# Patient Record
Sex: FEMALE | Race: BLACK OR AFRICAN AMERICAN | Employment: OTHER | URBAN - METROPOLITAN AREA
[De-identification: names, ages, dates, MRNs, and addresses within clinical notes are randomized per-mention and may not be internally consistent; named-entity substitution may affect disease eponyms.]

---

## 2021-03-17 ENCOUNTER — HOSPITAL ENCOUNTER (OUTPATIENT)
Dept: NUTRITION | Age: 71
Discharge: HOME | End: 2021-03-17
Payer: MEDICARE

## 2021-03-17 PROCEDURE — 97802 MEDICAL NUTRITION INDIV IN: CPT

## 2021-03-17 NOTE — PROGRESS NOTES
[facility] - Outpatient Nutrition Services  [address] [place] [address]. [facility], [facility]   Nutrition Assessment  Medical Nutrition Therapy   Outpatient Initial Evaluation         Patient Name: Festus Kiser : 1950   Treatment Diagnosis: CKD, unexplained weight loss    Referral Source: Dewey Page MD [facility] Care [facility] [place]): 3/17/2021     Gender: female Age: 72 y.o. Ht: 60 in Wt: 120  lb  kg   BMI: 23.4 normal BMR   Male  BMR Female 991     Past Medical History:  MS, major depressive disorder, age-related osteoporosis, tobacco abuse (quit in ), ETOH abuse (sober since ), esophageal varices, cirrhosis of the liver, HTN, GERD, dislipidemia, gout     Pertinent Medications:   Lipitor (the patient says she doesn't take this one), blood pressure medicines, Abilify   Biochemical Data:   2/10/21: BUN 31, cre 1.6, GFR 37.9     Assessment:   The patient is here today but she does not know why she is here. She was sent to RD for unintentional weight loss. The patient also has CKD but she has not seen a nephrologist. She says she keeps calling and no one will call her back. The patient states, \"I don't like to eat. \" The patient says she lives with her sister but her sister doesn't eat with her because they don't have the same schedule. The patient says she cannot prepare her own meals because she can't stand at the stove. She also says she has been falling a lot. She came today alone; she walks with a walker. The patient reports that she used to see a psychologist but she hasn't seen anyone in person since the start of the pandemic. Food & Nutrition: The patient provided RD with very little diet history information today. It was very difficult to get the patient to answer the questions. \"I don't eat mammal.\" The patient said she eats only \"fish and foul. \" The patient says she likes most vegetables. She says she does salt her food at the table.  She will eat whatever she is served for dinner. She usually eats a \"breakfast bar\" for breakfast and pineapple/orange juice. She skips lunch. She might drink a Premier Protein drink during the day. She hates drinking water. Estimate Needs   Calories: 1200 for wt maintenance Protein: 44-  55  (.8-1.0 g/kg) Carbs: Fat:    Kcal/day  g/day  g/day  g/day        percent:                    Nutrition Diagnosis   Nutrition knowledge deficit related to healthy eating and diagnosis of CKD as evidenced by the patient did not know why she was seeing RD today and the patient does not enjoy eating. The patient has psychological problems related to eating food. The patient also does not have control over the foods she is eating because she does not grocery shop and prepare her own meals. Nutrition Intervention &  Education: Discussed healthy eating strategies with patient. Encouraged the patient to eat 4-6 \"mini meals\" each day to maintain weight. Eat on a schedule- eating something every 2-3 hours. The patient may consume nutrition supplements if not going to eat a meal. Discussed low sodium diet with patient: don't use salt at the table, don't eats processed foods and canned vegetables and soups. Follow renal guidelines from nephrologist.  RD contact info provided for questions and concerns.      Handouts Provided: []  Carbohydrates  []  Protein  []  Non-starchy Vegetables  []  Food Label  [x]  Meal and Snack Ideas  []  Food Journals []  Diabetes  []  Cholesterol  [x]  Sodium  [x]  Gen Nutr Guidelines  []  SBGM Guidelines  [x]  Others: Nutrition and Kidney Disease   Information Reviewed with: Patient   Readiness to Change Stage: [x]  Pre-contemplative    []  Contemplative  []  Preparation               []  Action                  []  Maintenance   Potential Barriers to Learning: []  Decline in memory (?)   []  Language barrier   []  Other:  [x]  Emotional                  [x]  Limited mobility  [x]  Lack of motivation     [x] Vision, hearing or cognitive impairment   Expected Compliance: Poor. The patient says she does not like to eat. She's says her sister does not eat with her and the patient will not eat alone. She would rather eat \"chocolate stuff. \" She has not seen a nephrologist yet and does not seem to understand why she is seeing RD today. Recommend patient see phychologist/counselor to address depression and poor appetite. Nutritional Goal - To promote lifestyle changes to result in:    []  Weight loss  []  Improved diabetic control  []  Decreased cholesterol levels  []  Decreased blood pressure  [x]  Weight maintenance []  Preventing any interactions associated with food allergies  []  Adequate weight gain toward goal weight  [x]  Other: Prevent acceleration of kidney disease       Patient Goals:   1. Eat 4-6 \"mini meals\" per day to maintain weight. May drink nutrition supplements as appropriate if not going to eat a meal.  2. Follow renal guidelines per nephrologist  3. Be more physical active as tolerated/appropriate. May try chair exercises; walking with walker if safe. Exercise improves mood. Dietitian Signature: Lesly Cueto RD,[facility] Date: 3/17/2021   Follow-up: PRN. Patient will need to bring someone with her (sister) to next appointment as she does not prepare her own meals and chooses not to eat. It is not appropriate for the patient to come alone.  Time: 12:36 PM

## 2021-03-23 ENCOUNTER — HOSPITAL ENCOUNTER (OUTPATIENT)
Dept: PHYSICAL THERAPY | Age: 71
Discharge: HOME | End: 2021-03-23
Payer: MEDICARE

## 2021-03-23 PROCEDURE — 97535 SELF CARE MNGMENT TRAINING: CPT

## 2021-03-23 PROCEDURE — 97163 PT EVAL HIGH COMPLEX 45 MIN: CPT

## 2021-03-23 NOTE — PROGRESS NOTES
[address] [address] [facility] THERAPY   [facility] [address],[facility], [address] [facility] [address] - Phone: [phone]  Fax: [phone] [facility] / [address]  Patient Name: Vicky Perdue : 1950   Medical   Diagnosis: Ataxic gait [R26.0], Repeated Falls R29.6 Treatment Diagnosis: Ataxic gait [R26.0], Repeated Falls R29.6   Onset Date: Worsening over the past year     Referral Source: Am[facility]): 3/23/2021   Prior Hospitalization: See medical history Provider #: [id number]   Prior Level of Function: Was falling less often. Comorbidities: PTSD, HTN, syncope, frequent falls, Left LE surgery, depression, MS, low back pain   Medications: Verified on Patient Summary List   The Plan of Care and following information is based on the information from the initial evaluation.   ===========================================================================================  Subjective: \"I'm not sure why I've been sent here. \" Reports HA and tinnitus in both ears. Reports a few weeks ago was in the kitchen and maybe she turned with the walker and fell. Before that she was taking the trash bag out with her sister, then were tugging and she fell backward. She reports fear of falling is getting worse \"four hundred times worse\". This past Thanksgiving she fell down the stairs due to not picking up her Left LE. The Left LE was broken prior. During this story pt is weeping and reports very scared. Denies being dizzy, but will black out. Says has HTN and takes 4x BP meds, but she can't remember them all. (All meds in hard chart). Assessment / key information:  Pt presents to [facility] at [facility].  with signs and symptoms congruent with a diagnosis of impaired balance, Fear-Of-falling behavior, reduced activity tolerance and reported vasovagal syncope.  This limits her community and home interactions and reduces her QOL and puts her at increased falls risk. Patient would benefit from skilled intervention to address the above deficits, improve quality of life and return patient to maximum level of available function. OBJECTIVE:   BP: 130/90 mmHg  Gait/Posture: Pt uses 4WW (rollator). Fear of not having UE support. Flexed trunk, shortened step length, limited knee motion and hip flexion on the left. Noted shoe build-up on the left due to prior injury/surgery in the 90's. TOPETE Balance Scale: 27/56 indicating severe falls risk. Dizziness Handicap Inventory [facility]): 60/100 **Pt denies dizziness, but reports that the score represents her difficulty with those items**    Timed Up n GO (TUG): 29 sec without AD. 24 sec with 4WW.  5 time sit-to-stand (5xSTS): 39 sec definite use of hands on knees. Left hip flexion is limited functionally, slow and limited left knee bend and LEFT hip flexion is at a 3/5 MMT.    ===========================================================================================  Eval Complexity: History HIGH Complexity :3+ comorbidities / personal factors will impact the outcome/ POC ;  Examination  HIGH Complexity : 4+ Standardized tests and measures addressing body structure, function, activity limitation and / or participation in recreation ; Presentation HIGH Complexity : Unstable and unpredictable characteristics ;   Decision Making Other outcome measures DHI and TOPETE  HIGH ; Overall Complexity HIGH   Problem List: pain affecting function, impaired gait/ balance, decrease ADL/ functional abilitiies, decrease activity tolerance, decrease flexibility/ joint mobility and decrease transfer abilities   Treatment Plan may include any combination of the following: Therapeutic exercise, Therapeutic activities, Neuromuscular re-education, Gait/balance training, Aquatic therapy, Patient education, Self Care training and Functional mobility training  Patient / Family readiness to learn indicated by: asking questions and interest  Persons(s) to be included in education: patient (P)  Barriers to Learning/Limitations: None  Measures taken, if barriers to learning:    Patient Goal (s): Get her left leg working   Patient self reported health status: fair  Rehabilitation Potential: good   Short Term Goals: To be accomplished in  4  weeks  STG1 Pt to report adherence and demonstrate compliance with basic HEP to allow progress between visits  STG2 Pt demonstrate 5xSTS to <28 sec with/without use of hands to indicate improved function in the home   Long Term Goals: To be accomplished in  8  weeks  LTG1 Pt to improve TOPETE score to 40/56 indicating improved function and reduced falls risk  LTG2 Pt to reduce DHI score to 45/100 to indicate improved QOL and reduced falls risk  LTG3 Pt to deny falls over the past 4 weeks to indicate reduction in falls risk  LTG4 Pt to reduce TUG score to <20 sec with/without AD to indicate improved QOL and reduced falls risk    Frequency / Duration:   Patient to be seen  2  times per week for 8  weeks  Patient / Caregiver education and instruction: exercises  Therapist Signature: Florence Scott PT Date: 4/77/0011   Certification Period: 3/23/21 - 6/21/21 Time: 2:51 PM   ===========================================================================================  I certify that the above Physical Therapy Services are being furnished while the patient is under my care. I agree with the treatment plan and certify that this therapy is necessary. Physician Signature:        Date:       Time:                                        Ag Asif, *  Please sign and return to [facility] at [facility], [facility]. or you may fax the signed copy to [phone]. Thank you.

## 2021-03-23 NOTE — PROGRESS NOTES
PHYSICAL THERAPY - DAILY TREATMENT NOTE    Patient Name: Chela Benites        Date: 3/23/2021  : 1950   YES Patient  Verified  Visit #:     Insurance: Payor: Lachelle Shi / Plan: [address] [place] [address] HMO / Product Type: Managed Care Medicare /      In time: 3:45 Out time: 4:$5   Total Treatment Time: 60     Medicare/BCBS Time Tracking (below)   Total Timed Codes (min):  15 1:1 Treatment Time:  60     TREATMENT AREA =  Ataxic gait [R26.0]    SUBJECTIVE    Pain Level (on 0 to 10 scale):  N/A  / 10   Medication Changes/New allergies or changes in medical history, any new surgeries or procedures? NO    If yes, update Summary List   Subjective Functional Status/Changes:  []  No changes reported     See POC          OBJECTIVE    15 min Self Care: Reviewed diagnosis, prognosis, therapy progression, HEP-exercises   Rationale:    Improve understanding of injury and therapy to have realistic expectation of therapy to improve compliance/adherence and satisfaction    Billed With/As:   [] TE   [] TA   [] Neuro   [x] Self Care Patient Education: [x] Review HEP    [] Progressed/Changed HEP based on:   [x] Addressed barriers and behaviors     [] Therapeutic Neuroscience Pain Education, metaphor, reframing, contexts.     [x] Sleep Education   [] Body Mechanics [x] Healing Timeframe     [] Self STM with ball at \"the spot\"  [x] Walking Program/Global Activity   [] other:         Other Objective/Functional Measures:    See POC note     Post Treatment Pain Level (on 0 to 10) scale:   N/A / 10     ASSESSMENT  Assessment/Changes in Function:     See POC     []  See Progress Note/Recertification   Patient will continue to benefit from skilled PT services to modify and progress therapeutic interventions, address functional mobility deficits, address ROM deficits, address strength deficits, analyze and address soft tissue restrictions, analyze and cue movement patterns, analyze and modify body mechanics/ergonomics and assess and modify postural abnormalities to attain goals per POC. Progress toward goals / Updated goals:    Pt evaluated today.  See POC     PLAN  [x]  Upgrade activities as tolerated YES Continue plan of care   []  Discharge due to :    []  Other:      Therapist: Oren Walker, PT, DPT, OCS    Date: 3/23/2021 Time: 2:50 PM

## 2021-03-31 ENCOUNTER — HOSPITAL ENCOUNTER (OUTPATIENT)
Dept: PHYSICAL THERAPY | Age: 71
Discharge: HOME | End: 2021-03-31
Payer: MEDICARE

## 2021-03-31 PROCEDURE — 97116 GAIT TRAINING THERAPY: CPT

## 2021-03-31 PROCEDURE — 97530 THERAPEUTIC ACTIVITIES: CPT

## 2021-03-31 PROCEDURE — 97110 THERAPEUTIC EXERCISES: CPT

## 2021-03-31 NOTE — PROGRESS NOTES
PHYSICAL THERAPY - DAILY TREATMENT NOTE    Patient Name: Owen Best        Date: 3/31/2021  : 1950   YES Patient  Verified  Visit #:   2   of   16  Insurance: Payor: Cornel Isatu / Plan: [address] [place] [address] HMO / Product Type: Managed Care Medicare /      In time: 11:29 Out time: 12:13   Total Treatment Time: 44     Medicare Time /BCBS Tracking (below)   Total Timed Codes (min):  44 1:1 Treatment Time:  44     TREATMENT AREA =  Ataxic gait [R26.0]    SUBJECTIVE  Pain Level (on 0 to 10 scale):  3  / 10 HA   Medication Changes/New allergies or changes in medical history, any new surgeries or procedures? NO    If yes, update Summary List   Subjective Functional Status/Changes:  []  No changes reported     Patient reports that each day she has HA and back/left hip pain. She lost her home exercise handouts. OBJECTIVE  19 min Therapeutic Exercise:  [x]  See flow sheet   Rationale:      increase strength to improve the patients ability to stand, walk and perform ADLs       15 min Therapeutic Activity: Patient practiced sit to stand and was educated in safe execution and equal weight bearing on BLE. Postural education to increase back extension for sitting and standing. Sitting with equal weight on ischial tuberosities. Rationale:    na to improve the patients ability to na     min Neuromuscular Re-ed: t   Rationale:    Improve dizziness and balance to improve the patients ability to perform ADLs, walk and to decrease fall risk    10 min Gait Training: Gait with wheeled walker 80 feet x1, 120 x1 with supervision and education on maintaining trunk extension and for safety with stand to sit. Rationale:   Improve dynamic balance to increase stability with walking and decrease fall risk      min Patient Education:  YES  Reviewed HEP   []  Progressed/Changed HEP based on:  Issued 2 copies of HEP. Other Objective/Functional Measures:      Reviewed HEP.   Added sit to stand, LAQs, HR/TR, modified tandem(heel to instep), gait training with wheeled walker. Post Treatment Pain Level (on 0 to 10) scale:   3  / 10     ASSESSMENT  Assessment/Changes in Function:     Decreased safety awareness with sit to stand and stand to sit. Patient required cueing for correct execution of all exercises. Flexed posture while walking with wheeled walker which patient can correct with cueing. Impaired use of hip balance strategy. []  See Progress Note/Recertification   Patient will continue to benefit from skilled PT services to modify and progress therapeutic interventions, address functional mobility deficits, address ROM deficits, address strength deficits, analyze and address soft tissue restrictions, analyze and cue movement patterns, analyze and modify body mechanics/ergonomics and assess and modify postural abnormalities to attain goals per POC.    Progress toward goals / Updated goals:  STG1 Pt to report adherence and demonstrate compliance with basic HEP to allow progress between visits  STG2 Pt demonstrate 5xSTS to <28 sec with/without use of hands to indicate improved function in the home       PLAN  [x]  Upgrade activities as tolerated YES Continue plan of care   []  Discharge due to :    []  Other:      Therapist: Char White PT    Date: 3/31/2021 Time: 12:13 PM     Future Appointments   Date Time Provider Jo Carpenter   3/31/2021 11:15 AM Kera Sanford, PT [facility]   4/7/2021 11:30 AM JT Navarrete [id number]   4/9/2021 10:00 AM Sravanthi Neal PTA [facility]   4/14/2021 11:30 AM Sravanthi Neal PTA [facility]   4/16/2021 11:00 AM Lacy Masterson PT [facility]   4/21/2021 11:30 AM Sravanthi Neal PTA [facility]   4/23/2021 11:00 AM Lacy Masterson, PT [facility]   4/28/2021 11:30 AM Sravanthi Neal PTA [facility]   4/30/2021 11:00 AM Lucio Potts, PT [phone]

## 2021-04-07 ENCOUNTER — APPOINTMENT (OUTPATIENT)
Dept: PHYSICAL THERAPY | Age: 71
End: 2021-04-07
Payer: MEDICARE

## 2021-04-09 ENCOUNTER — APPOINTMENT (OUTPATIENT)
Dept: PHYSICAL THERAPY | Age: 71
End: 2021-04-09
Payer: MEDICARE

## 2021-04-14 ENCOUNTER — APPOINTMENT (OUTPATIENT)
Dept: PHYSICAL THERAPY | Age: 71
End: 2021-04-14
Payer: MEDICARE

## 2021-04-16 ENCOUNTER — APPOINTMENT (OUTPATIENT)
Dept: PHYSICAL THERAPY | Age: 71
End: 2021-04-16
Payer: MEDICARE

## 2021-04-21 ENCOUNTER — HOSPITAL ENCOUNTER (OUTPATIENT)
Dept: PHYSICAL THERAPY | Age: 71
Discharge: HOME | End: 2021-04-21
Payer: MEDICARE

## 2021-04-21 PROCEDURE — 97110 THERAPEUTIC EXERCISES: CPT

## 2021-04-21 PROCEDURE — 97140 MANUAL THERAPY 1/> REGIONS: CPT

## 2021-04-21 NOTE — PROGRESS NOTES
PHYSICAL THERAPY - DAILY TREATMENT NOTE    Patient Name: Belem Reeves        Date: 2021  : 1950   yes Patient  Verified  Visit #:   3   of   16  Insurance: Payor: Eddie Charlton / Plan: [address] [place] [address] HMO / Product Type: Managed Care Medicare /      In time:  Out time: 1210   Total Treatment Time: 35     Medicare/BCBS Time Tracking (below)   Total Timed Codes (min):  35 1:1 Treatment Time:  35     TREATMENT AREA =  Ataxic gait [R26.0]    SUBJECTIVE  Pain Level (on 0 to 10 scale): 3-4  / 10   Medication Changes/New allergies or changes in medical history, any new surgeries or procedures?    no  If yes, update Summary List   Subjective Functional Status/Changes:  []  No changes reported     i'm always in a lot of pain,  Patient denies performance of HEP. OBJECTIVE      25 min Therapeutic Exercise:  [x]  See flow sheet   Rationale:      increase ROM, increase strength, improve coordination and improve balance to improve the patients ability to perform functional mobility activities with decrease concerns for safety. 10 min Manual Therapy: STM L/S paraspinals (B) and (R) QL in sitting position. Rationale:      decrease pain, increase ROM and increase tissue extensibility to improve patient's ability to perform functional mobility activities with decrease c/o symptoms. The manual therapy interventions were performed at a separate and distinct time from the therapeutic activities interventions. Billed With/As:   [x] TE   [] TA   [] Neuro   [] Self Care Patient Education: [x] Review HEP    [] Progressed/Changed HEP based on:   [] positioning   [] body mechanics   [] transfers   [] heat/ice application    [] other:      Other Objective/Functional Measures:    Continue with therx per flow sheet. All therx performed in parallel bars.      Post Treatment Pain Level (on 0 to 10) scale:   0  / 10     ASSESSMENT  Assessment/Changes in Function:     Overall good technique with sit <> stand transfer, uses hands to support and push up with, demonstrates in weight bearing through (R) LE. Good reduction of pain after therx and manual.     []  See Progress Note/Recertification   Patient will continue to benefit from skilled PT services to modify and progress therapeutic interventions, address functional mobility deficits, address ROM deficits, address strength deficits and analyze and cue movement patterns to attain remaining goals. Progress toward goals / Updated goals:    STG1 Pt to report adherence and demonstrate compliance with basic HEP to allow progress between visits  STG2 Pt demonstrate 5xSTS to <28 sec with/without use of hands to indicate improved function in the home  · Long Term Goals:  To be accomplished in  8  weeks  LTG1 Pt to improve TOPETE score to 40/56 indicating improved function and reduced falls risk  LTG2 Pt to reduce DHI score to 45/100 to indicate improved QOL and reduced falls risk  LTG3 Pt to deny falls over the past 4 weeks to indicate reduction in falls risk  LTG4 Pt to reduce TUG score to <20 sec with/without AD to indicate improved QOL and reduced falls risk        PLAN  []  Upgrade activities as tolerated yes Continue plan of care   []  Discharge due to :    []  Other:      Therapist: Bhaskar Malhotra PTA    Date: 4/21/2021 Time: 6:20 AM     Future Appointments   Date Time Provider Jo Carpenter   4/21/2021 11:30 AM Isabella Arango PTA [facility]   4/23/2021 11:00 AM Junior Lozano, PT [facility]   4/28/2021 11:30 AM Isabella Arango PTA [facility]   4/30/2021 11:00 AM Hue Potts, PT [phone]

## 2021-04-23 ENCOUNTER — HOSPITAL ENCOUNTER (OUTPATIENT)
Dept: PHYSICAL THERAPY | Age: 71
Discharge: HOME | End: 2021-04-23
Payer: MEDICARE

## 2021-04-23 PROCEDURE — 97110 THERAPEUTIC EXERCISES: CPT

## 2021-04-23 PROCEDURE — 97140 MANUAL THERAPY 1/> REGIONS: CPT

## 2021-04-23 NOTE — PROGRESS NOTES
PHYSICAL THERAPY - DAILY TREATMENT NOTE    Patient Name: Sharon Vo        Date: 2021  : 1950   yes Patient  Verified  Visit #:   4   of   16  Insurance: Payor: Sumaya Jaylin / Plan: [address] [place] [address] HMO / Product Type: Managed Care Medicare /      In time:  Out time: 12:00   Total Treatment Time: 45     Medicare/BCBS Time Tracking (below)   Total Timed Codes (min):  45 1:1 Treatment Time:  45     TREATMENT AREA =  Ataxic gait [R26.0]    SUBJECTIVE  Pain Level (on 0 to 10 scale): 3-4  / 10   Medication Changes/New allergies or changes in medical history, any new surgeries or procedures?    no  If yes, update Summary List   Subjective Functional Status/Changes:  []  No changes reported     Reports she liked the manual therapy last visit. Says she can't do anything any more. He low back hurts when she stands and she can't do anything with her left leg. OBJECTIVE  35 min Therapeutic Exercise:  [x]  See flow sheet   Rationale:      increase ROM, increase strength, improve coordination and improve balance to improve the patients ability to perform functional mobility activities with decrease concerns for safety. 10 min Manual Therapy: STM L/S paraspinals (B) and (R) QL in sitting position   Rationale:      decrease pain, increase ROM and increase tissue extensibility to improve patient's ability to perform functional mobility activities with decrease c/o symptoms. The manual therapy interventions were performed at a separate and distinct time from the therapeutic activities interventions. Restroom break taken mid-session    Billed With/As:   [x] TE   [] TA   [] Neuro   [] Self Care Patient Education: [x] Review HEP    [] Progressed/Changed HEP based on:   [] positioning   [] body mechanics   [] transfers   [] heat/ice application    [] other:      Other Objective/Functional Measures:    Continue with therx per flow sheet. All therx performed in parallel bars.     1x LOB to the right with side stepping without UE support. Pt did not lift the left LE to clear the ground. Stumble noted, pt reached for //bars and I assisted at shoulders to avoid any fall. Post Treatment Pain Level (on 0 to 10) scale:   0-3  / 10     ASSESSMENT  Assessment/Changes in Function:     Left LE remains weak with poor ROM reducing function. LBP wit standing drills when pt does not use UE support. Lumbar fatigue and difficulty with extending lumbar ot remain neutral/upright. Attempted to progress drills, pt with poor personal outlook, high barriers verbalized to activity. Max level cues utilized to improve motion and performance. Pt declined modified tandem stance today. Sitting manual STM not tolerated as prior in session. Pt with significant pain along the right QL which was aggravated by STM in session. []  See Progress Note/Recertification   Patient will continue to benefit from skilled PT services to modify and progress therapeutic interventions, address functional mobility deficits, address ROM deficits, address strength deficits and analyze and cue movement patterns to attain remaining goals. Progress toward goals / Updated goals:    STG1 Pt to report adherence and demonstrate compliance with basic HEP to allow progress between visits  STG2 Pt demonstrate 5xSTS to <28 sec with/without use of hands to indicate improved function in the home  · Long Term Goals:  To be accomplished in  8  weeks  LTG1 Pt to improve TOPETE score to 40/56 indicating improved function and reduced falls risk  LTG2 Pt to reduce DHI score to 45/100 to indicate improved QOL and reduced falls risk  LTG3 Pt to deny falls over the past 4 weeks to indicate reduction in falls risk  LTG4 Pt to reduce TUG score to <20 sec with/without AD to indicate improved QOL and reduced falls risk        PLAN  []  Upgrade activities as tolerated yes Continue plan of care   []  Discharge due to :    []  Other:      Therapist: Hue Tomlin Katlyn, PT    Date: 4/23/2021 Time: 11:05 AM     Future Appointments   Date Time Provider Jo Carpenter   4/28/2021 11:30 AM Anai Alfredo [facility]   4/30/2021 11:00 AM Alfonso Potts, PT [id number]

## 2021-04-28 ENCOUNTER — HOSPITAL ENCOUNTER (OUTPATIENT)
Dept: PHYSICAL THERAPY | Age: 71
Discharge: HOME | End: 2021-04-28
Payer: MEDICARE

## 2021-04-28 PROCEDURE — 97140 MANUAL THERAPY 1/> REGIONS: CPT

## 2021-04-28 PROCEDURE — 97110 THERAPEUTIC EXERCISES: CPT

## 2021-04-28 NOTE — PROGRESS NOTES
PHYSICAL THERAPY - DAILY TREATMENT NOTE    Patient Name: Joe Copping        Date: 2021  : 1950   yes Patient  Verified  Visit #:      of   16  Insurance: Payor: Jaye Hurtado / Plan: [address] [place] [address] HMO / Product Type: Managed Care Medicare /      In time: 8046 Out time: 0664   Total Treatment Time: 35     Medicare/BCBS Time Tracking (below)   Total Timed Codes (min):  35 1:1 Treatment Time:  35     TREATMENT AREA =  Ataxic gait [R26.0]    SUBJECTIVE  Pain Level (on 0 to 10 scale):  1  / 10   Medication Changes/New allergies or changes in medical history, any new surgeries or procedures?    no  If yes, update Summary List   Subjective Functional Status/Changes:  []  No changes reported     i'm doing well today, i'm here. [place] fine after last session          OBJECTIVE    25 min Therapeutic Exercise:  [x]  See flow sheet   Rationale:      increase ROM, increase strength, improve coordination and improve balance to improve the patients ability to perform functional mobility activities with decrease concerns for safety. 10 min Manual Therapy: STM L/S paraspinals (B) and (R) QL in sitting position. Rationale:      decrease pain, increase ROM and increase tissue extensibility to improve patient's ability to perform functional mobility activities with decrease concerns for safety. The manual therapy interventions were performed at a separate and distinct time from the therapeutic activities interventions. Billed With/As:   [x] TE   [] TA   [] Neuro   [] Self Care Patient Education: [x] Review HEP    [] Progressed/Changed HEP based on:   [] positioning   [] body mechanics   [] transfers   [] heat/ice application    [] other:      Other Objective/Functional Measures: Add seated hip add and abd (ball/peach) 10 x 3 seconds.      Post Treatment Pain Level (on 0 to 10) scale:   2/ 10     ASSESSMENT  Assessment/Changes in Function:     Patient ambulated 75' x 2 without RW with gait belt and CGA for safety,  Patient was hesiitant while ambulating, but was able to perform task without significant difficulty     []  See Progress Note/Recertification   Patient will continue to benefit from skilled PT services to modify and progress therapeutic interventions, address functional mobility deficits, address ROM deficits, address strength deficits, analyze and address soft tissue restrictions and analyze and cue movement patterns to attain remaining goals. Progress toward goals / Updated goals:    STG1 Pt to report adherence and demonstrate compliance with basic HEP to allow progress between visits  STG2 Pt demonstrate 5xSTS to <28 sec with/without use of hands to indicate improved function in the home  · Long Term Goals:  To be accomplished in  8  weeks  LTG1 Pt to improve TOPETE score to 40/56 indicating improved function and reduced falls risk  LTG2 Pt to reduce DHI score to 45/100 to indicate improved QOL and reduced falls risk  LTG3 Pt to deny falls over the past 4 weeks to indicate reduction in falls risk  LTG4 Pt to reduce TUG score to <20 sec with/without AD to indicate improved QOL and reduced falls risk       PLAN  []  Upgrade activities as tolerated yes Continue plan of care   []  Discharge due to :    []  Other:      Therapist: Dillon Del Cid PTA    Date: 4/28/2021 Time: 6:25 AM     Future Appointments   Date Time Provider Jo Carpenter   4/28/2021 11:30 AM Zonia Weber, PTA [facility]   4/30/2021 11:00 AM Jinny Dickinson, PT [facility]   5/5/2021 12:15 PM Zonia Weber, PTA [facility]   5/7/2021 11:00 AM Jinny Dickinson, PT [facility]   5/11/2021 11:15 AM Zonia Weber, PTA [facility]   5/13/2021  1:15 PM Jinny Dickinson, PT [facility]   5/18/2021  1:15 PM Jinny Dickinson, PT [facility]   5/20/2021 12:30 PM Jinny Dickinson, PT [facility]   5/25/2021 11:15 AM Zonia Weber, PTA [facility]   5/27/2021 12:30 PM Dancigers, Eulice Nageotte, PT [id number]

## 2021-04-30 ENCOUNTER — HOSPITAL ENCOUNTER (OUTPATIENT)
Dept: PHYSICAL THERAPY | Age: 71
Discharge: HOME | End: 2021-04-30
Payer: MEDICARE

## 2021-04-30 PROCEDURE — 97530 THERAPEUTIC ACTIVITIES: CPT

## 2021-04-30 PROCEDURE — 97110 THERAPEUTIC EXERCISES: CPT

## 2021-04-30 NOTE — PROGRESS NOTES
PHYSICAL THERAPY - DAILY TREATMENT NOTE    Patient Name: Carlos Matos        Date: 2021  : 1950   yes Patient  Verified  Visit #:   6      16  Insurance: Payor: Eben Citizen / Plan: [address] [place] [address] HMO / Product Type: Managed Care Medicare /      In time: 1100 Out time: 5695   Total Treatment Time: 45     Medicare/BCBS Time Tracking (below)   Total Timed Codes (min):  45 1:1 Treatment Time:  45     TREATMENT AREA =  Ataxic gait [R26.0]    SUBJECTIVE  Pain Level (on 0 to 10 scale):  1  / 10   Medication Changes/New allergies or changes in medical history, any new surgeries or procedures?    no  If yes, update Summary List   Subjective Functional Status/Changes:  []  No changes reported     Says she hasn't figured out how she's doing yet. She's only been getting ready to come here and hasn't taken an inventory. OBJECTIVE  15 min Therapeutic Activity: [x]  See flow sheet   Rationale:    increase ROM, increase strength and improve coordination to improve the patients ability to tolerate distance, transfer and improve balance and stability in standing    30 min Therapeutic Exercise:  [x]  See flow sheet   Rationale:      increase ROM, increase strength, improve coordination and improve balance to improve the patients ability to perform functional mobility activities with decrease concerns for safety. Billed With/As:   [x] TE   [] TA   [] Neuro   [] Self Care Patient Education: [x] Review HEP    [] Progressed/Changed HEP based on:   [x] positioning   [x] body mechanics   [] transfers   [] heat/ice application    [] other:      Other Objective/Functional Measures:    Pt did not use 4WW after entering clinic. After coughing: pt reports hiatal hernia pain. This pain recurred 2-3x after coughing in session. I instructed her to alert her PCP/Nurse, since she was about to be in contact with them to get blood work done later today. Pt reports she might call later about it. Post Treatment Pain Level (on 0 to 10) scale:   0-4/ 10     ASSESSMENT  Assessment/Changes in Function:     Pt continues to have poor self efficacy and external locus of control, yet willing to work through current status to improve standing and walking. Balance drills highly agitating to pt, max level cues to assist her in //bars during. Pt completed drills per flowsheet. Patient ambulated 75' x 2, 35 ft x1 without RW with gait belt and CGA for safety,  Patient was hesiitant while ambulating, but was able to perform task without significant difficulty     []  See Progress Note/Recertification   Patient will continue to benefit from skilled PT services to modify and progress therapeutic interventions, address functional mobility deficits, address ROM deficits, address strength deficits, analyze and address soft tissue restrictions and analyze and cue movement patterns to attain remaining goals. Progress toward goals / Updated goals:    STG1 Pt to report adherence and demonstrate compliance with basic HEP to allow progress between visits  STG2 Pt demonstrate 5xSTS to <28 sec with/without use of hands to indicate improved function in the home  · Long Term Goals:  To be accomplished in  8  weeks  LTG1 Pt to improve TOPETE score to 40/56 indicating improved function and reduced falls risk  LTG2 Pt to reduce DHI score to 45/100 to indicate improved QOL and reduced falls risk  LTG3 Pt to deny falls over the past 4 weeks to indicate reduction in falls risk  LTG4 Pt to reduce TUG score to <20 sec with/without AD to indicate improved QOL and reduced falls risk       PLAN  []  Upgrade activities as tolerated yes Continue plan of care   []  Discharge due to :    []  Other:      Therapist: Miri Schrader PT    Date: 4/30/2021 Time: 6:25 AM     Future Appointments   Date Time Provider Jo Carpenter   5/5/2021 12:15 PM Santhosh Hogue [facility]   5/7/2021 11:00 AM Sharonda Asher, PT [id number]   5/11/2021 11:15 AM [facility]   5/13/2021  1:15 PM Damari Purdy, PT [facility]   5/18/2021  1:15 PM Damari Purdy, PT [facility]   5/20/2021 12:30 PM Damari Purdy, PT [facility]   5/25/2021 11:15 AM [facility]   5/27/2021 12:30 PM Vel Potts, PT [id number]

## 2021-05-05 ENCOUNTER — HOSPITAL ENCOUNTER (OUTPATIENT)
Dept: PHYSICAL THERAPY | Age: 71
Discharge: HOME | End: 2021-05-05
Payer: MEDICARE

## 2021-05-05 PROCEDURE — 97140 MANUAL THERAPY 1/> REGIONS: CPT

## 2021-05-05 PROCEDURE — 97110 THERAPEUTIC EXERCISES: CPT

## 2021-05-05 NOTE — PROGRESS NOTES
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   · Short Term Goals: To be accomplished in  4  weeks  STG1 Pt to report adherence and demonstrate compliance with basic HEP to allow progress between visits: Status: Variable  STG2 Pt demonstrate 5xSTS to <28 sec with/without use of hands to indicate improved function in the home: Status: No change  · Long Term Goals: To be accomplished in  8  weeks  LTG1 Pt to improve TOPETE score to 40/56 indicating improved function and reduced falls risk  LTG2 Pt to reduce DHI score to 45/100 to indicate improved QOL and reduced falls risk  LTG3 Pt to deny falls over the past 4 weeks to indicate reduction in falls risk  LTG4 Pt to reduce TUG score to <20 sec with/without AD to indicate improved QOL and reduced falls risk    Key Functional Changes/Progress: Patient seen for Evaluation, 2 follow-ups, then this Re-Cert visit. Pt continues to present with poor safety and functional walking and mobility skills. Her current plan to to be seen for 12 more visits to address deconditioning, walking, balance and safety. I recommend this POC take place at a 2x/wk frequency.     Problem List: pain affecting function, decrease ROM, decrease strength, impaired gait/ balance, decrease ADL/ functional abilitiies and decrease activity tolerance   Treatment Plan may include any combination of the following: Therapeutic exercise, Therapeutic activities, Neuromuscular re-education, Physical agent/modality, Gait/balance training, Manual therapy, Patient education, Self Care training and Functional mobility training     Patient Goal(s) has been updated and includes:  See above STGs, LTGs    Goals for this certification period include and are to be achieved in   6  weeks:    Frequency / Duration:   Patient to be seen   2   times per week for   6   more weeks from date of service. Assessments/Recommendations:   Continue to progress this patient, as able, towards new/stated goals    If you have any questions/comments please contact us directly at [phone]. Thank you for allowing us to assist in the care of your patient. Therapist Signature: Tiffanie Marquez PT Date: 2/9/7289   Certification Period:  Reporting Period: 3/23/21 - 6/21/21  3/23/21 - 4/23/21 Time: 6:56 AM   NOTE TO PHYSICIAN:  PLEASE COMPLETE THE ORDERS BELOW AND FAX TO   [facility]: [phone]  If you are unable to process this request in 24 hours please contact our office: [phone]    ___ I have read the above report and request that my patient continue as recommended.   ___ I have read the above report and request that my patient continue therapy with the following changes/special instructions: ________________________________________________   ___ I have read the above report and request that my patient be discharged from therapy.      Physician Signature:        Date:       Time:                                       Delmis Levi, *

## 2021-05-05 NOTE — PROGRESS NOTES
PHYSICAL THERAPY - DAILY TREATMENT NOTE    Patient Name: Jose Geronimo        Date: 2021  : 1950   yes Patient  Verified  Visit #:   7      16  Insurance: Payor: Reginald Navarrociro / Plan: [address] [place] [address] HMO / Product Type: Managed Care Medicare /      In time: 3023 Out time: 100   Total Treatment Time: 40     Medicare/BCBS Time Tracking (below)   Total Timed Codes (min):  40 1:1 Treatment Time:  40     TREATMENT AREA =  Ataxic gait [R26.0]    SUBJECTIVE  Pain Level (on 0 to 10 scale):  5  / 10   Medication Changes/New allergies or changes in medical history, any new surgeries or procedures?    no  If yes, update Summary List   Subjective Functional Status/Changes:  []  No changes reported     My (L) side of my neck and shoulder are really bothering me more. Patient ambulated into clinic with use of her sister's SPC which not at the appropriate height. Patient stated that someone had take her walker out of the car but did not return it. OBJECTIVE    30 min Therapeutic Exercise:  [x]  See flow sheet   Rationale:      increase ROM, increase strength and improve coordination to improve the patients ability to perform functional mobility activities with decrease concerns for safety.       10 min Manual Therapy: STM L/S paraspinals (B) and (R) QL in sitting position. Rationale:      decrease pain, increase ROM and increase tissue extensibility to improve patient's ability to perform functional mobility activities with decrease concerns for safety.    The manual therapy interventions were performed at a separate and distinct time from the therapeutic activities interventions.     Billed With/As:   [x] TE   [] TA   [] Neuro   [] Self Care Patient Education: [x] Review HEP    [] Progressed/Changed HEP based on:   [] positioning   [] body mechanics   [] transfers   [] heat/ice application    [] other:      Other Objective/Functional Measures:    Continue with therx per flow sheet for LE and core strengthening. Add: tap ups 4\" with 1# ankle weights 15x,  In parallel bars: small hurdles with non-reciprocal pattern and 1# ankle weights. Post Treatment Pain Level (on 0 to 10) scale:   4  / 10     ASSESSMENT  Assessment/Changes in Function:     Patient ambulated without use of AD and HHA  For steadying assistance. []  See Progress Note/Recertification   Patient will continue to benefit from skilled PT services to modify and progress therapeutic interventions, address functional mobility deficits, address ROM deficits, address strength deficits, analyze and address soft tissue restrictions and analyze and cue movement patterns to attain remaining goals. Progress toward goals / Updated goals:    STG1 Pt to report adherence and demonstrate compliance with basic HEP to allow progress between visits  STG2 Pt demonstrate 5xSTS to <28 sec with/without use of hands to indicate improved function in the home  · Long Term Goals:  To be accomplished in  8  weeks  LTG1 Pt to improve TOPETE score to 40/56 indicating improved function and reduced falls risk  LTG2 Pt to reduce DHI score to 45/100 to indicate improved QOL and reduced falls risk  LTG3 Pt to deny falls over the past 4 weeks to indicate reduction in falls risk  LTG4 Pt to reduce TUG score to <20 sec with/without AD to indicate improved QOL and reduced falls risk       PLAN  []  Upgrade activities as tolerated yes Continue plan of care   []  Discharge due to :    []  Other:      Therapist: Erasto Pham PTA    Date: 5/5/2021 Time: 6:37 AM     Future Appointments   Date Time Provider Jo Carpenter   5/5/2021 12:15 PM Rodrigo Ramesh [facility]   5/7/2021 11:00 AM Ana Paula Chase PT [facility]   5/11/2021 11:15 AM Ricardo Garcia PTA [place] [facility]   5/13/2021  1:15 PM Ana Paula Chase PT [facility]   5/18/2021  1:15 PM Ana Paula Chase PT [facility]   5/20/2021 12:30 PM Ana Paula Chase PT [facility]   5/25/2021 11:15 AM Tito Hamilton [facility]   5/27/2021 12:30 PM Derian Potts, PT [id number]

## 2021-05-07 ENCOUNTER — HOSPITAL ENCOUNTER (OUTPATIENT)
Dept: PHYSICAL THERAPY | Age: 71
Discharge: HOME | End: 2021-05-07
Payer: MEDICARE

## 2021-05-07 PROCEDURE — 97530 THERAPEUTIC ACTIVITIES: CPT

## 2021-05-07 PROCEDURE — 97112 NEUROMUSCULAR REEDUCATION: CPT

## 2021-05-07 NOTE — PROGRESS NOTES
PHYSICAL THERAPY - DAILY TREATMENT NOTE    Patient Name: Sharon Vo        Date: 2021  : 1950   yes Patient  Verified  Visit #:   8      16  Insurance: Payor: Sumaya Luos / Plan: [address] [place] [address] HMO / Product Type: Managed Care Medicare /      In time: 1100 Out time: 1140   Total Treatment Time: 40     Medicare/BCBS Time Tracking (below)   Total Timed Codes (min):  40 1:1 Treatment Time:  40     TREATMENT AREA =  Ataxic gait [R26.0]    SUBJECTIVE  Pain Level (on 0 to 10 scale):  0-2  / 10   Medication Changes/New allergies or changes in medical history, any new surgeries or procedures?    no  If yes, update Summary List   Subjective Functional Status/Changes:  []  No changes reported     Says the left leg got swollen. Not really hurting today. Says she doesn't want to do the exercises today, but she will if she has to. Says the left arm is tingling for the past 2-3 days, it hurts. She may call her Neurologist.        OBJECTIVE  25 min Neuromuscular Re-ed: [x]  See flow sheet   Rationale:    improve coordination, improve balance and increase proprioception to improve the patients ability to control LEs, reduce falls risk. 10 min Therapeutic Activity: [x]  See flow sheet   Rationale:    increase strength and improve coordination to improve the patients ability to step, walk for duration, remain upright for standing    5 (NB) min Manual Therapy: Left trap, subscap STM   Rationale:      decrease pain and increase tissue extensibility to improve patient's ability to move the left arm with less tingling. The manual therapy interventions were performed at a separate and distinct time from the therapeutic activities interventions.     Billed With/As:   [x] TE   [] TA   [] Neuro   [] Self Care Patient Education: [x] Review HEP    [] Progressed/Changed HEP based on:   [] positioning   [] body mechanics   [] transfers   [] heat/ice application    [] other:      Other Objective/Functional Measures:    Gait with gait belt and CGA for ~140ft. Post Treatment Pain Level (on 0 to 10) scale:   4  / 10     ASSESSMENT  Assessment/Changes in Function:     Pt with reluctance to attempt balance drills. Max level cues/coaching to have pt complete these in //bars. Once attempted pt demonstrates good tolerance here. Worked stepping, narrow base of support, gait and LE strength. Primary complaint in session was of neuropathic left UE pain and parasthesias. []  See Progress Note/Recertification   Patient will continue to benefit from skilled PT services to modify and progress therapeutic interventions, address functional mobility deficits, address ROM deficits, address strength deficits, analyze and address soft tissue restrictions and analyze and cue movement patterns to attain remaining goals. Progress toward goals / Updated goals:    STG1 Pt to report adherence and demonstrate compliance with basic HEP to allow progress between visits  STG2 Pt demonstrate 5xSTS to <28 sec with/without use of hands to indicate improved function in the home  · Long Term Goals:  To be accomplished in  8  weeks  LTG1 Pt to improve TOPETE score to 40/56 indicating improved function and reduced falls risk  LTG2 Pt to reduce DHI score to 45/100 to indicate improved QOL and reduced falls risk  LTG3 Pt to deny falls over the past 4 weeks to indicate reduction in falls risk  LTG4 Pt to reduce TUG score to <20 sec with/without AD to indicate improved QOL and reduced falls risk       PLAN  [x]  Upgrade activities as tolerated yes Continue plan of care   []  Discharge due to :    []  Other:      Therapist: Noemi Mohan, PT    Date: 5/7/2021 Time: 11:00 AM     Future Appointments   Date Time Provider Jo Carpenter   5/7/2021 11:00 AM Babita Julian,  [address] [facility]   5/11/2021 11:15 AM Dania Weber,  [address] [facility]   5/13/2021  1:15 PM Babita Julian  [address] [facility]   5/18/2021  1:15 PM Sudhir Potts, PT [facility]   5/20/2021 12:30 PM Shawna Kim, PT [facility]   5/25/2021 11:15 AM Sheryle Planas [facility]   5/27/2021 12:30 PM Alicja Potts, PT [address]

## 2021-05-11 ENCOUNTER — HOSPITAL ENCOUNTER (OUTPATIENT)
Dept: PHYSICAL THERAPY | Age: 71
Discharge: HOME | End: 2021-05-11
Payer: MEDICARE

## 2021-05-11 PROCEDURE — 97140 MANUAL THERAPY 1/> REGIONS: CPT

## 2021-05-11 PROCEDURE — 97110 THERAPEUTIC EXERCISES: CPT

## 2021-05-11 NOTE — PROGRESS NOTES
PHYSICAL THERAPY - DAILY TREATMENT NOTE    Patient Name: Kayode Gordillo        Date: 2021  : 1950   yes Patient  Verified  Visit #:     Insurance: Payor: Carolyn Obando / Plan: [address] [place] [address] HMO / Product Type: Managed Care Medicare /      In time: 6317 Out time: 1200   Total Treatment Time: 45     Medicare/BCBS Time Tracking (below)   Total Timed Codes (min):  45 1:1 Treatment Time:  45     TREATMENT AREA =  Ataxic gait [R26.0]    SUBJECTIVE  Pain Level (on 0 to 10 scale):  3  / 10   Medication Changes/New allergies or changes in medical history, any new surgeries or procedures?    no  If yes, update Summary List   Subjective Functional Status/Changes:  []  No changes reported     I haven't really had much pain until I stand up an walk. OBJECTIVE    35 min Therapeutic Exercise:  [x]  See flow sheet   Rationale:      increase ROM, increase strength, improve coordination and improve balance to improve the patients ability to perform functional mobility activities with decrease concerns for safety.      10 min Manual Therapy: STM L/S paraspinals (B) and (R) QL in sitting position. Rationale:      decrease pain, increase ROM and increase tissue extensibility to improve patient's ability to perform functional mobility activities with decrease concerns for safety.   The manual therapy interventions were performed at a separate and distinct time from the therapeutic activities interventions.     Billed With/As:   [x] TE   [] TA   [] Neuro   [] Self Care Patient Education: [x] Review HEP    [] Progressed/Changed HEP based on:   [] positioning   [] body mechanics   [] transfers   [] heat/ice application    [] other:      Other Objective/Functional Measures:    GROC: 2 a little better    Max pain  5/10, least pain 2 /10, average pain 3 /10     Post Treatment Pain Level (on 0 to 10) scale:   0  / 10     ASSESSMENT  Assessment/Changes in Function:     Patient reports ~  30% overall improvement with performance of functional mobility activities and general ADLs. Pt demonstrate 6 STS to <28 sec with/without use of hands    Pt reports no falls over the past 4 weeks to indicate reduction in falls risk     []  See Progress Note/Recertification   Patient will continue to benefit from skilled PT services to modify and progress therapeutic interventions, address functional mobility deficits, address ROM deficits, address strength deficits, analyze and address soft tissue restrictions and analyze and cue movement patterns to attain remaining goals. Progress toward goals / Updated goals:    STG1 Pt to report adherence and demonstrate compliance with basic HEP to allow progress between visits  · STG2 Pt demonstrate 5xSTS to <28 sec with/without use of hands to indicate improved function in the home: Achieved 5/11/21  Long Term Goals:  To be accomplished in  8  weeks  LTG1 Pt to improve TOPETE score to 40/56 indicating improved function and reduced falls risk  LTG2 Pt to reduce DHI score to 45/100 to indicate improved QOL and reduced falls risk  LTG3 Pt to deny falls over the past 4 weeks to indicate reduction in falls risk: Achieved 5/11/21  LTG4 Pt to reduce TUG score to <20 sec with/without AD to indicate improved QOL and reduced falls risk     PLAN  []  Upgrade activities as tolerated yes Continue plan of care   []  Discharge due to :    []  Other:      Therapist: Terrell Lester PTA    Date: 5/11/2021 Time: 7:26 AM     Future Appointments   Date Time Provider Jo Carpenter   5/11/2021 11:15 AM Dania Weber,  [address] [facility]   5/13/2021  1:15 PM Babita Julian PT [address]   5/18/2021  1:15 PM Babita Julian, PT [address]   5/20/2021 12:30 PM Babita Julian  [address] [facility]   5/25/2021 11:15 AM Dania Weber,  [address] [facility]   5/27/2021 12:30 PM Sudhir Potts, PT [address]

## 2021-05-13 ENCOUNTER — HOSPITAL ENCOUNTER (OUTPATIENT)
Dept: PHYSICAL THERAPY | Age: 71
Discharge: HOME | End: 2021-05-13
Payer: MEDICARE

## 2021-05-13 PROCEDURE — 97112 NEUROMUSCULAR REEDUCATION: CPT

## 2021-05-13 PROCEDURE — 97530 THERAPEUTIC ACTIVITIES: CPT

## 2021-05-13 PROCEDURE — 97110 THERAPEUTIC EXERCISES: CPT

## 2021-05-13 NOTE — PROGRESS NOTES
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    no  If yes, update Summary List   Subjective Functional Status/Changes:  []  No changes reported     Says on Tuesday she went to the ER for the left arm tingling. Her PCP said to go to the ER to rule out that she wasn't having a stroke. She did tests and EKG and blood taking. She may have Pinched nerve, they gave her Aspercream patch and steroid taper. OBJECTIVE  10 min Therapeutic Exercise:  [x]  See flow sheet   Rationale:      increase ROM, increase strength, improve coordination and improve balance to improve the patients ability to perform functional mobility activities with decrease concerns for safety.      15 min Therapeutic Activity: [x]  See flow sheet   Rationale:    increase strength and improve coordination to improve the patients ability to walk, stand and step with improve function and confidence    20 min Neuromuscular Re-ed: [x]  See flow sheet   Rationale:    improve coordination, improve balance and increase proprioception to improve the patients ability to control body without UE support and on/off varied surface.       Billed With/As:   [x] TE   [] TA   [] Neuro   [] Self Care Patient Education: [x] Review HEP    [] Progressed/Changed HEP based on:   [] positioning   [] body mechanics   [] transfers   [] heat/ice application    [] other:      Other Objective/Functional Measures:    Applied Aspercream patch to the left shoulder     Post Treatment Pain Level (on 0 to 10) scale:   0  / 10     ASSESSMENT  Assessment/Changes in Function:     Pt able to start session as prior. Focus on overall mobility, walking tolerance and balance. Pt able to be coached to complete drills in session. Fear of falling present. []  See Progress Note/Recertification   Patient will continue to benefit from skilled PT services to modify and progress therapeutic interventions, address functional mobility deficits, address ROM deficits, address strength deficits, analyze and address soft tissue restrictions and analyze and cue movement patterns to attain remaining goals. Progress toward goals / Updated goals:    STG1 Pt to report adherence and demonstrate compliance with basic HEP to allow progress between visits  · STG2 Pt demonstrate 5xSTS to <28 sec with/without use of hands to indicate improved function in the home: Achieved 5/11/21  Long Term Goals:  To be accomplished in  8  weeks  LTG1 Pt to improve TOPETE score to 40/56 indicating improved function and reduced falls risk  LTG2 Pt to reduce DHI score to 45/100 to indicate improved QOL and reduced falls risk  LTG3 Pt to deny falls over the past 4 weeks to indicate reduction in falls risk: Achieved 5/11/21  LTG4 Pt to reduce TUG score to <20 sec with/without AD to indicate improved QOL and reduced falls risk     PLAN  [x]  Upgrade activities as tolerated yes Continue plan of care   []  Discharge due to :    []  Other:      Therapist: Maren Acuña PT    Date: 5/13/2021 Time: 7:26 AM     Future Appointments   Date Time Provider Jo Carpenter   5/18/2021  1:15 PM Lio Sanchez PT [facility]   5/20/2021 12:30 PM Lio Sanchez PT [facility]   5/25/2021 11:15 AM Emile Rodrigues PTA [facility]   5/27/2021 12:30 PM Krissy Potts, PT [address]

## 2021-05-18 ENCOUNTER — HOSPITAL ENCOUNTER (OUTPATIENT)
Dept: PHYSICAL THERAPY | Age: 71
Discharge: HOME | End: 2021-05-18
Payer: MEDICARE

## 2021-05-18 PROCEDURE — 97530 THERAPEUTIC ACTIVITIES: CPT

## 2021-05-18 PROCEDURE — 97112 NEUROMUSCULAR REEDUCATION: CPT

## 2021-05-18 PROCEDURE — 97535 SELF CARE MNGMENT TRAINING: CPT

## 2021-05-18 NOTE — PROGRESS NOTES
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 The left arm is the worst.     Goal/Measure of Progress Goal Met? 1. STG1 Pt to report adherence and demonstrate compliance with basic HEP to allow progress between visits   Status at last Eval: Initiated Current Status: Variable progressing   2. Pt demonstrate 5xSTS to <28 sec with/without use of hands to indicate improved function in the home   Status at last Eval: 39 sec, definite use of UEs Current Status: <28 sec yes   3. Pt to improve TOPETE score to 40/56 indicating improved function and reduced falls risk   Status at last Eval: 27/56 Current Status: 37/56 progressing     4. Pt to reduce DHI score to 45/100 to indicate improved QOL and reduced falls risk   Status at last Eval: 60/100 Current Status: 34/100 yes   5. Pt to deny falls over the past 4 weeks to indicate reduction in falls risk   Status at last Eval: Falling Current Status: Met to date yes   6. Pt to reduce TUG score to <20 sec with/without AD to indicate improved QOL and reduced falls ris   Status at last Eval: 29 sec without AD. 24 sec with 4WW.  Current Status: 24 sec without AD,  18 sec with 4WW ~yes     Key Functional Changes/Progress: Pt seen for Evaluation and 10 follow-up visits to address deconditioning, balance and falls risk. She has made progress in [address], TOPETE, TUG scores. Her progress is limited by fear-of-falling behavior and comorbidities at present. Current POC is set for 5 more visits. We will use these to continue to progress pt, as able. She reports some Co-Pay concerns and we will assess her for further PT need at end of current POC. Problem List: pain affecting function, decrease strength, impaired gait/ balance, decrease ADL/ functional abilitiies, decrease activity tolerance and decrease transfer abilities   Treatment Plan may include any combination of the following: Therapeutic exercise, Therapeutic activities, Neuromuscular re-education, Physical agent/modality, Gait/balance training, Manual therapy, Patient education, Self Care training and Functional mobility training     Patient Goal(s) has been updated and includes:  See above unmet goals    Goals for this certification period are to be achieved in   2-3  weeks:    Frequency / Duration:   Patient to be seen   2   times per week for   2-3   more weeks. Assessments/Recommendations:   Continue to progress this patient, as able, towards new/stated goals    If you have any questions/comments please contact us directly at [phone]. Thank you for allowing us to assist in the care of your patient.     Therapist Signature: Miri Schrader, PT Date: 1/83/4333   Certification Period:  Reporting Period: 3/23/21 - 6/21/21  3/23/21 - 5/18/21 Time: 2:21 PM   NOTE TO PHYSICIAN:  PLEASE COMPLETE THE ORDERS BELOW AND FAX TO   [facility] Physical [facility]: [phone]  If you are unable to process this request in 24 hours please contact our office: [phone]    ___ I have read the above report and request that my patient continue as recommended.   ___ I have read the above report and request that my patient continue therapy with the following changes/special instructions: ________________________________________________   ___ I have read the above report and request that my patient be discharged from therapy.      Physician Signature:        Date:       Time:                                       Colletta Das, *

## 2021-05-18 NOTE — PROGRESS NOTES
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    no  If yes, update Summary List   Subjective Functional Status/Changes:  []  No changes reported     Reports her arm is the same or worse. The Aspercream helped, but the doctor said not to use it. She will have an MRI next. Reports she is 50% of where she wants to be. The left arm is the worst.        OBJECTIVE  10 min Therapeutic Activity: [x]  See flow sheet   Rationale:    increase strength and improve coordination to improve the patients ability to walk, stand and step with improve function and confidence    10 min Neuromuscular Re-ed: [x]  See flow sheet   Rationale:    improve coordination, improve balance and increase proprioception to improve the patients ability to control body without UE support and on/off varied surface. 30 min Self Care:  Activity modification, Pt Ed, POC planning, progress and prognosis   Rationale:    increase strength and improve coordination to improve the patients ability to follow-through with HEP and behavior change    Billed With/As:   [x] TE   [] TA   [] Neuro   [] Self Care Patient Education: [x] Review HEP    [] Progressed/Changed HEP based on:   [] positioning   [] body mechanics   [] transfers   [] heat/ice application    [] other:      Other Objective/Functional Measures:    TOPETE/56  DHI: 34/100  TU sec without AD,  18 sec with 9PA     Post Treatment Pain Level (on 0 to 10) scale:   5  / 10     ASSESSMENT  Assessment/Changes in Function:     PN today. Discussed future POC and she reports she wishes to finish out current POC (5x more visits). She is primarily concerned for her left arm and nerve pain at this time. []  See Progress Note/Recertification   Patient will continue to benefit from skilled PT services to modify and progress therapeutic interventions, address functional mobility deficits, address ROM deficits, address strength deficits, analyze and address soft tissue restrictions and analyze and cue movement patterns to attain remaining goals. Progress toward goals / Updated goals:    STG1 Pt to report adherence and demonstrate compliance with basic HEP to allow progress between visits Status: Variable. · STG2 Pt demonstrate 5xSTS to <28 sec with/without use of hands to indicate improved function in the home: Achieved 5/11/21  Long Term Goals: To be accomplished in  8  weeks  LTG1 Pt to improve TOPETE score to 40/56 indicating improved function and reduced falls risk Status: 37/56 (was: 27/56)  LTG2 Pt to reduce DHI score to 45/100 to indicate improved QOL and reduced falls risk Status: 34/100  (was: 60/100)  LTG3 Pt to deny falls over the past 4 weeks to indicate reduction in falls risk: Achieved 5/11/21  LTG4 Pt to reduce TUG score to <20 sec with/without AD to indicate improved QOL and reduced falls risk Status: 24 sec without AD,  18 sec with 4WW  (was: 29 sec without AD.   24 sec with 4WW.)     PLAN  [x]  Upgrade activities as tolerated yes Continue plan of care   []  Discharge due to :    []  Other:      Therapist: Fidelia Lopez, PT    Date: 5/18/2021 Time: 7:26 AM     Future Appointments   Date Time Provider Jo Carpenter   5/20/2021 12:30 PM Ami Cutler, PT [facility]   5/25/2021 11:15 AM Anai Alfredo [facility]   5/27/2021 12:30 PM Alfonso Potts, PT [address]

## 2021-05-20 ENCOUNTER — HOSPITAL ENCOUNTER (OUTPATIENT)
Dept: PHYSICAL THERAPY | Age: 71
Discharge: HOME | End: 2021-05-20
Payer: MEDICARE

## 2021-05-20 PROCEDURE — 97530 THERAPEUTIC ACTIVITIES: CPT

## 2021-05-20 PROCEDURE — 97112 NEUROMUSCULAR REEDUCATION: CPT

## 2021-05-25 ENCOUNTER — HOSPITAL ENCOUNTER (OUTPATIENT)
Dept: PHYSICAL THERAPY | Age: 71
Discharge: HOME | End: 2021-05-25
Payer: MEDICARE

## 2021-05-25 PROCEDURE — 97530 THERAPEUTIC ACTIVITIES: CPT

## 2021-05-25 PROCEDURE — 97110 THERAPEUTIC EXERCISES: CPT

## 2021-05-25 NOTE — PROGRESS NOTES
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    no  If yes, update Summary List   Subjective Functional Status/Changes:  []  No changes reported     Patient arrived 15 minutes late for session. I am always hurting and in pain somewhere         OBJECTIVE    25 min Therapeutic Exercise:  -  See flow sheet   Rationale:      increase ROM, increase strength, improve coordination and improve balance to improve the patients ability to walk, stand and step with improve function and confidence       15 min Therapeutic Activity: [x]  See flow sheet   Rationale:    increase ROM, increase strength, improve coordination and improve balance to improve the patients ability to walk, stand and step with improve function and confidence    Billed With/As:   [x] TE   [] TA   [] Neuro   [] Self Care Patient Education: [x] Review HEP    [] Progressed/Changed HEP based on:   [] positioning   [] body mechanics   [] transfers   [] heat/ice application    [] other:      Other Objective/Functional Measures:    Continue with therx per flow sheet for strengthening and stability of core. Post Treatment Pain Level (on 0 to 10) scale:   1  / 10     ASSESSMENT  Assessment/Changes in Function:     No UE support used for sit <> stand.   Ambulated 75 feet x 2 with CGA     []  See Progress Note/Recertification   Patient will continue to benefit from skilled PT services to modify and progress therapeutic interventions, address functional mobility deficits, address ROM deficits, address strength deficits, analyze and address soft tissue restrictions and analyze and cue movement patterns to attain remaining goals. Progress toward goals / Updated goals:    Goal/Measure of Progress Goal Met? 1.  Pt to report adherence and demonstrate compliance with basic HEP to allow progress between visits   2.   Pt to improve TOPETE score to 40/56 indicating improved function and reduced falls risk          PLAN  []  Upgrade activities as tolerated yes Continue plan of care   []  Discharge due to :    []  Other:      Therapist: Marissa Patel PTA    Date: 5/25/2021 Time: 7:49 AM     Future Appointments   Date Time Provider Jo Carpenter   5/25/2021 11:15 AM Kwan Ferguson, [facility] [address] [address]   5/27/2021 12:30 PM Verenice Potts, PT [address]

## 2021-05-27 ENCOUNTER — HOSPITAL ENCOUNTER (OUTPATIENT)
Dept: PHYSICAL THERAPY | Age: 71
Discharge: HOME | End: 2021-05-27
Payer: MEDICARE

## 2021-05-27 PROCEDURE — 97110 THERAPEUTIC EXERCISES: CPT

## 2021-05-27 PROCEDURE — 97530 THERAPEUTIC ACTIVITIES: CPT

## 2021-05-27 NOTE — PROGRESS NOTES
PHYSICAL THERAPY - DAILY TREATMENT NOTE    Patient Name: Kayode Gordillo        Date: 2021  : 1950   yes Patient  Verified  Visit #:   15     Insurance: Payor: Carolyn Obando / Plan: [address] [place] [address] HMO / Product Type: Managed Care Medicare /      In time: 12:30 Out time: 1:15   Total Treatment Time: 45     Medicare/BCBS Time Tracking (below)   Total Timed Codes (min):  45 1:1 Treatment Time:  45     TREATMENT AREA =  Ataxic gait [R26.0]    SUBJECTIVE  Pain Level (on 0 to 10 scale):  5  / 10   Medication Changes/New allergies or changes in medical history, any new surgeries or procedures?    no  If yes, update Summary List   Subjective Functional Status/Changes:  []  No changes reported     Reports the back hurts, the left foot is swollen, the left hand is pins and needles. She forgot her 4WW today. OBJECTIVE    35 min Therapeutic Exercise:  -  See flow sheet   Rationale:      increase ROM, increase strength, improve coordination and improve balance to improve the patients ability to walk, stand and step with improve function and confidence       10 min Therapeutic Activity: [x]  See flow sheet   Rationale:    increase ROM, increase strength, improve coordination and improve balance to improve the patients ability to walk, stand and step with improve function and confidence    Billed With/As:   [x] TE   [] TA   [] Neuro   [] Self Care Patient Education: [x] Review HEP    [] Progressed/Changed HEP based on:   [] positioning   [] body mechanics   [] transfers   [] heat/ice application    [] other:      Other Objective/Functional Measures:    Presenting with SPC, using in the right UE. HEP:  Access Code: [email or web address]  URL: [email or web address]  Date: 2021  Prepared by: Elizabeth Smith    Exercises  Seated March - 2 x daily - 7 x weekly - 1 sets - 10 reps  Standing March with Counter Support - 2 x daily - 7 x weekly - 1 sets - 10 reps  Supine March - 2 x daily - 7 x weekly - 1 sets - 10 reps  Supine Lower Trunk Rotation - 2 x daily - 7 x weekly - 1 sets - 10 reps  Sit to Stand with Armchair - 2 x daily - 7 x weekly - 1 sets - 10 reps  Beginner Bridge - 2 x daily - 7 x weekly - 1 sets - 10 reps  Small Range Straight Leg Raise - 2 x daily - 7 x weekly - 2 sets - 10 reps  Standing Heel Raise with Support - 2 x daily - 7 x weekly - 2 sets - 10 reps     Post Treatment Pain Level (on 0 to 10) scale:   3  / 10     ASSESSMENT  Assessment/Changes in Function:     LBP in session, pt reporting some willingness to complete drills, pt coached for intiiation, form and follow-through. Expanded HEP and coached pt towards improved motivation for completing drills. []  See Progress Note/Recertification   Patient will continue to benefit from skilled PT services to modify and progress therapeutic interventions, address functional mobility deficits, address ROM deficits, address strength deficits, analyze and address soft tissue restrictions and analyze and cue movement patterns to attain remaining goals. Progress toward goals / Updated goals:    Goal/Measure of Progress Goal Met? 1.  Pt to report adherence and demonstrate compliance with basic HEP to allow progress between visits   2. Pt to improve TOPETE score to 40/56 indicating improved function and reduced falls risk          PLAN  []  Upgrade activities as tolerated yes Continue plan of care   []  Discharge due to :    []  Other:      Therapist: Black Watt PT    Date: 5/27/2021 Time: 7:49 AM     No future appointments.

## 2021-06-07 ENCOUNTER — HOSPITAL ENCOUNTER (OUTPATIENT)
Dept: PHYSICAL THERAPY | Age: 71
Discharge: HOME | End: 2021-06-07
Payer: MEDICARE

## 2021-06-07 PROCEDURE — 97110 THERAPEUTIC EXERCISES: CPT

## 2021-06-07 PROCEDURE — 97535 SELF CARE MNGMENT TRAINING: CPT

## 2021-06-07 NOTE — PROGRESS NOTES
PHYSICAL THERAPY - DAILY TREATMENT NOTE    Patient Name: Hector Redmond        Date: 2021  : 1950   yes Patient  Verified  Visit #:     Insurance: Payor: Ethyl Eladia / Plan: [address] [place] [address] HMO / Product Type: Managed Care Medicare /      In time: 1:45 Out time: 2:25   Total Treatment Time: 40     Medicare/BCBS Time Tracking (below)   Total Timed Codes (min):  40 1:1 Treatment Time:  40     TREATMENT AREA =  Ataxic gait [R26.0]    SUBJECTIVE  Pain Level (on 0 to 10 scale):  5  / 10   Medication Changes/New allergies or changes in medical history, any new surgeries or procedures?    no  If yes, update Summary List   Subjective Functional Status/Changes:  []  No changes reported     Wants me to fix the height of her cane. Says she doesn't like using it in the house. Says not hurting as bad, but she just hurts all over. Says she wants to know where you can find happiness. OBJECTIVE    30 min Therapeutic Exercise:  -  See flow sheet   Rationale:      increase ROM, increase strength, improve coordination and improve balance to improve the patients ability to walk, stand and step with improve function and confidence     10 min Self Care: Activity modification, Pt Ed   Rationale:    improve coordination to improve the patients ability to complete healthy behaviors, continue with HEP      Billed With/As:   [x] TE   [] TA   [] Neuro   [] Self Care Patient Education: [x] Review HEP    [] Progressed/Changed HEP based on:   [] positioning   [] body mechanics   [] transfers   [] heat/ice application    [] other:      Other Objective/Functional Measures:    Adjusted [facility] for this patient. Post Treatment Pain Level (on 0 to 10) scale:   3  / 10     ASSESSMENT  Assessment/Changes in Function:     Discussed therapeutic mindset, worked gait with SPC for activity tolerance. Coached pt through active stretches.  Pt progressing well.     []  See Progress Note/Recertification Patient will continue to benefit from skilled PT services to modify and progress therapeutic interventions, address functional mobility deficits, address ROM deficits, address strength deficits, analyze and address soft tissue restrictions and analyze and cue movement patterns to attain remaining goals. Progress toward goals / Updated goals:    Goal/Measure of Progress Goal Met? 1.  Pt to report adherence and demonstrate compliance with basic HEP to allow progress between visits   2.   Pt to improve TOPETE score to 40/56 indicating improved function and reduced falls risk          PLAN  []  Upgrade activities as tolerated yes Continue plan of care   []  Discharge due to :    []  Other:      Therapist: Reina Brock PT    Date: 6/7/2021 Time: 7:49 AM     Future Appointments   Date Time Provider Jo Carpenter   6/9/2021 12:15 PM Victoriano Jimenez, [facility]   6/15/2021 11:30 AM Victoriano Jimenez PTA [facility]   6/17/2021 10:45 AM Victoriano Jimenez, JT [facility]   6/23/2021 12:45 PM Eber Kinsey, PT [facility]   6/25/2021 11:00 AM Eber Kinsey, PT [facility]   6/28/2021  1:00 PM Eber Kinsey, PT [facility]   6/30/2021 12:45 PM Scot Potts, PT [phone]

## 2021-06-09 ENCOUNTER — HOSPITAL ENCOUNTER (OUTPATIENT)
Dept: PHYSICAL THERAPY | Age: 71
Discharge: HOME | End: 2021-06-09
Payer: MEDICARE

## 2021-06-09 PROCEDURE — 97530 THERAPEUTIC ACTIVITIES: CPT

## 2021-06-09 PROCEDURE — 97110 THERAPEUTIC EXERCISES: CPT

## 2021-06-09 PROCEDURE — 97112 NEUROMUSCULAR REEDUCATION: CPT

## 2021-06-09 NOTE — PROGRESS NOTES
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    no  If yes, update Summary List   Subjective Functional Status/Changes:  []  No changes reported     No new c/o          OBJECTIVE    20 min Therapeutic Exercise:  [x]  See flow sheet   Rationale:      increase ROM, increase strength, improve coordination and improve balance to improve the patients ability to walk, stand and step with improve function and confidence      10 min Therapeutic Activity: [x]  See flow sheet   Rationale:    increase ROM, increase strength, improve coordination and improve balance to improve the patients ability to walk, stand and step with improve function and confidence     10 min Neuromuscular Re-ed: [x]  See flow sheet   Rationale:    increase ROM, increase strength, improve coordination and improve balance to improve the patients ability to walk, stand and step with improve function and confidence     Billed With/As:   [x] TE   [] TA   [] Neuro   [] Self Care Patient Education: [x] Review HEP    [] Progressed/Changed HEP based on:   [] positioning   [] body mechanics   [] transfers   [] heat/ice application    [] other:      Other Objective/Functional Measures:    Continue with therx per flow sheet. Add step up on 4 inch step in parallel bars.       Post Treatment Pain Level (on 0 to 10) scale:   0  / 10     ASSESSMENT  Assessment/Changes in Function:     Patient did not require assistance today to perform SLR or dead bug on (L) LE. Sit <> stand with UE support for assistance    No assistance required while ambulating with SPC. Slightly unsteady secondary to decrease function of (L) LE.     []  See Progress Note/Recertification   Patient will continue to benefit from skilled PT services to modify and progress therapeutic interventions, address functional mobility deficits, address ROM deficits, address strength deficits and analyze and cue movement patterns to attain remaining goals. Progress toward goals / Updated goals:    Goal/Measure of Progress Goal Met?    1.  Pt to report adherence and demonstrate compliance with basic HEP to allow progress between visits   2.  Pt to improve TOPETE score to 40/56 indicating improved function and reduced falls risk          PLAN  []  Upgrade activities as tolerated yes Continue plan of care   []  Discharge due to :    []  Other:      Therapist: Huy Silverman PTA    Date: 6/9/2021 Time: 6:17 AM     Future Appointments   Date Time Provider Jo Carpenter   6/9/2021 12:15 PM Gracy Beatty [address] [facility]   6/15/2021 11:30 AM China Riser,  [address] [facility]   6/17/2021 10:45 AM China Riser,  [address] [facility]   6/23/2021 12:45 PM Wing Mello,  [address] [facility]   6/25/2021 11:00 AM Wing Mello,  [address] [facility]   6/28/2021  1:00 PM Wing Mello,  [address] [facility]   6/30/2021 12:45 PM Patty Potts, PT [id number]

## 2021-06-17 ENCOUNTER — HOSPITAL ENCOUNTER (OUTPATIENT)
Dept: PHYSICAL THERAPY | Age: 71
Discharge: HOME | End: 2021-06-17
Payer: MEDICARE

## 2021-06-17 PROCEDURE — 97530 THERAPEUTIC ACTIVITIES: CPT

## 2021-06-17 PROCEDURE — 97110 THERAPEUTIC EXERCISES: CPT

## 2021-06-17 PROCEDURE — 97116 GAIT TRAINING THERAPY: CPT

## 2021-06-17 NOTE — PROGRESS NOTES
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    no  If yes, update Summary List   Subjective Functional Status/Changes:  []  No changes reported     I haven't noticed any pain lately, maybe just a discomfort though,          OBJECTIVE    20 min Therapeutic Exercise:  [x]  See flow sheet   Rationale:      increase ROM, increase strength, improve coordination and improve balance to improve the patients ability to walk, stand and step with improve function and confidence       10 min Therapeutic Activity: [x]  See flow sheet   Rationale:    increase ROM, increase strength, improve coordination and improve balance to improve the patients ability to walk, stand and step with improve function and confidence      10 min Gait Training:  _75__ feet without device on level surfaces with close supervision for level of assistance   Rationale: To improve ambulation safety and efficiency in order to improve patient's ability to safely ambulate at home for self care. Billed With/As:   [x] TE   [] TA   [] Neuro   [] Self Care Patient Education: [x] Review HEP    [] Progressed/Changed HEP based on:   [] positioning   [] body mechanics   [] transfers   [] heat/ice application    [] other:      Other Objective/Functional Measures:    Continue with therx per flow sheet.      Post Treatment Pain Level (on 0 to 10) scale:   1  / 10 ASSESSMENT  Assessment/Changes in Function:     Patient is able to perform SIT <> stand and sit <> stand with transfer to another seat independently without use of AD and safely. Patient ambulated 75 feet without use of AD and close supervision. Patient is independent with sit <> supine. []  See Progress Note/Recertification   Patient will continue to benefit from skilled PT services to modify and progress therapeutic interventions, address functional mobility deficits, address ROM deficits, address strength deficits and analyze and cue movement patterns to attain remaining goals. Progress toward goals / Updated goals:    Goal/Measure of Progress Goal Met?    1.  Pt to report adherence and demonstrate compliance with basic HEP to allow progress between visits   2.  Pt to improve TOPETE score to 40/56 indicating improved function and reduced falls risk          PLAN  []  Upgrade activities as tolerated yes Continue plan of care   []  Discharge due to :    []  Other:      Therapist: Vinnie Sawyer PTA    Date: 6/17/2021 Time: 7:06 AM     Future Appointments   Date Time Provider Jo Carpenter   6/17/2021 10:45 AM Kacie Escobar  [address] [facility]   6/23/2021 12:45 PM Viky Potts,  [address] [facility]   6/25/2021 11:00 AM Dennis Negro, PT [address]   6/28/2021  1:00 PM Dennis Negro,  [address] [facility]   6/30/2021 12:45 PM Viky Potts,  [address] [facility]

## 2021-06-22 ENCOUNTER — APPOINTMENT (OUTPATIENT)
Dept: PHYSICAL THERAPY | Age: 71
End: 2021-06-22
Payer: MEDICARE

## 2021-06-23 ENCOUNTER — HOSPITAL ENCOUNTER (OUTPATIENT)
Dept: PHYSICAL THERAPY | Age: 71
Discharge: HOME | End: 2021-06-23
Payer: MEDICARE

## 2021-06-23 PROCEDURE — 97116 GAIT TRAINING THERAPY: CPT

## 2021-06-23 PROCEDURE — 97110 THERAPEUTIC EXERCISES: CPT

## 2021-06-23 PROCEDURE — 97530 THERAPEUTIC ACTIVITIES: CPT

## 2021-06-23 NOTE — PROGRESS NOTES
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 1. STG1 Pt to report adherence and demonstrate compliance with basic HEP to allow progress between visits   Status at last Eval: Initiated Current Status: Non-compliant, per patient no   2. Pt to improve TOPETE score to 40/56 indicating improved function and reduced falls risk   Status at last Eval: IE: /, PN: 37/56 Current Status: 38/56 Minimal change since last test   3. Pt to deny falls over the past 4 weeks to indicate reduction in falls risk   Status at last Eval: Met to date Current Status: Met to date yes      Key Functional Changes/Progress: Pt has made minimal functional and activity tolerance gains over past reporting period. Pt reports non-compliance with HEP. She has had left arm pain, low motivation/mood and limited interest in pushing beyond her current ability. She continues to have left LE weakness, poor stamina/activity tolerance, intermittent variable widespread pains and poor balance. Pt's MS a factor in the above. Pt's POC is set for 2 more visits, during which focus will be on gaining pt ability/capacity for community engagement and safety.  Anticipate DC next week at this time. Problem List: pain affecting function, decrease strength, impaired gait/ balance, decrease ADL/ functional abilitiies and decrease activity tolerance   Treatment Plan may include any combination of the following: Therapeutic exercise, Therapeutic activities, Neuromuscular re-education, Physical agent/modality, Gait/balance training, Manual therapy, Patient education, Self Care training and Functional mobility training     Patient Goal(s) has been updated and includes:  Unmet Goals above    Goals for this certification period include and are to be achieved in   2  visits:    Frequency / Duration:   Patient to be seen   2   times per week for   1   more weeks. Assessments/Recommendations:   Continue to progress this patient, as able, towards new/stated goals    If you have any questions/comments please contact us directly at [phone]. Thank you for allowing us to assist in the care of your patient. Therapist Signature: Alix Gamble PT Date: 2/11/2945   Certification Period:  Reporting Period: 6/21/21 - 7/2/21 5/18/21 - 6/23/21 Time: 1:40 PM   NOTE TO PHYSICIAN:  PLEASE COMPLETE THE ORDERS BELOW AND FAX TO   [facility]: [phone]  If you are unable to process this request in 24 hours please contact our office: [phone]    ___ I have read the above report and request that my patient continue as recommended.   ___ I have read the above report and request that my patient continue therapy with the following changes/special instructions: ________________________________________________   ___ I have read the above report and request that my patient be discharged from therapy.      Physician Signature:        Date:       Time:                                       India Gallardo, *

## 2021-06-23 NOTE — PROGRESS NOTES
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    no  If yes, update Summary List   Subjective Functional Status/Changes:  []  No changes reported     She has not fallen, she hasn't broken anything. \"Walking feels odd today. \" The left wrist/forearm hurts. 7/10. OBJECTIVE    27 min Therapeutic Exercise:  [x]  See flow sheet   Rationale:      increase ROM, increase strength, improve coordination and improve balance to improve the patients ability to walk, stand and step with improve function and confidence       10 min Therapeutic Activity: [x]  See flow sheet   Rationale:    increase ROM, increase strength, improve coordination and improve balance to improve the patients ability to walk, stand and step with improve function and confidence      8 min Gait Training:  _270__ feet with 4WW device on level surfaces with close supervision for level of assistance   Rationale: To improve ambulation safety and efficiency in order to improve patient's ability to safely ambulate at home for self care. Billed With/As:   [x] TE   [] TA   [] Neuro   [] Self Care Patient Education: [x] Review HEP    [] Progressed/Changed HEP based on:   [] positioning   [] body mechanics   [] transfers   [] heat/ice application    [] other:      Other Objective/Functional Measures:    Continue with therx per flow sheet.   TOPETE  (was: )       Post Treatment Pain Level (on 0 to 10) scale:   5  / 10     ASSESSMENT  Assessment/Changes in Function:     Walking tolerance: 5 minutes with 3EA. (Pt declines to finish to 6 minutes). PN today. Discussed POC. Pt wishes to complete next week's apts. []  See Progress Note/Recertification   Patient will continue to benefit from skilled PT services to modify and progress therapeutic interventions, address functional mobility deficits, address ROM deficits, address strength deficits and analyze and cue movement patterns to attain remaining goals. Progress toward goals / Updated goals:    Goal/Measure of Progress Goal Met? 1.  Pt to report adherence and demonstrate compliance with basic HEP to allow progress between visits Status: Non-compliant. 2.  Pt to improve TOPETE score to 40/56 indicating improved function and reduced falls risk:Status: 38/56 not met, no significant change.         PLAN  []  Upgrade activities as tolerated yes Continue plan of care   []  Discharge due to :    []  Other:      Therapist: Tomasz Crowley PT    Date: 6/23/2021 Time: 7:06 AM     Future Appointments   Date Time Provider Jo Carpenter   6/25/2021 11:00 AM Damari Purdy, PT [facility]   6/28/2021  1:00 PM Damari Purdy PT [facility]   6/30/2021 12:45 PM Vel Potts, PT [id number]

## 2021-06-24 ENCOUNTER — APPOINTMENT (OUTPATIENT)
Dept: PHYSICAL THERAPY | Age: 71
End: 2021-06-24
Payer: MEDICARE

## 2021-06-25 ENCOUNTER — APPOINTMENT (OUTPATIENT)
Dept: PHYSICAL THERAPY | Age: 71
End: 2021-06-25
Payer: MEDICARE

## 2021-06-28 ENCOUNTER — HOSPITAL ENCOUNTER (OUTPATIENT)
Dept: PHYSICAL THERAPY | Age: 71
Discharge: HOME | End: 2021-06-28
Payer: MEDICARE

## 2021-06-30 ENCOUNTER — APPOINTMENT (OUTPATIENT)
Dept: PHYSICAL THERAPY | Age: 71
End: 2021-06-30
Payer: MEDICARE

## 2021-06-30 NOTE — PROGRESS NOTES
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 1. STG1 Pt to report adherence and demonstrate compliance with basic HEP to allow progress between visits   Status at last Eval: Initiated Current Status: Non-compliant, per patient no   2. Pt to improve TOPETE score to 40/56 indicating improved function and reduced falls risk   Status at last Eval: IE: 27/56, PN: 37/56 Current Status: 38/56 Minimal change since last test   3. Pt to deny falls over the past 4 weeks to indicate reduction in falls risk   Status at last Eval: Met to date Current Status: Met to date yes      Current Status: Pt was seen for 18 visits. Her last visit (Progress NOTE 21) has all objective data and outcomes. I spoke with pt via phone. She reports she is feeling like she's gotten as good as she will get. She does have some back pain still. Reports her car is in the shop and she can't make it to therapy. We discussed current situation, she wishes DC. I will DC this episode of care at this time. Assessments/Recommendations:   Discharge patient due to transportation issues, and current progress. If you have any questions/comments please contact us directly at [phone].    Thank you for allowing us to assist in the care of your patient. Therapist Signature: Candice Carolina PT Date: 9/15/7815   Certification Period:  Reporting Period: 6/21/21 - 7/2/21 5/18/21 - 6/23/21 Time: 1:40 PM   NOTE TO PHYSICIAN:  PLEASE COMPLETE THE ORDERS BELOW AND FAX TO   [facility] [place] [facility]: [phone]  If you are unable to process this request in 24 hours please contact our office: [phone]    ___ I have read the above report and request that my patient continue as recommended.   ___ I have read the above report and request that my patient continue therapy with the following changes/special instructions: ________________________________________________   ___ I have read the above report and request that my patient be discharged from therapy.      Physician Signature:        Date:       Time:                                       Hanna Alex, *

## 2023-05-24 RX ORDER — CETIRIZINE HYDROCHLORIDE 10 MG/1
10 TABLET ORAL DAILY
COMMUNITY

## 2023-05-24 RX ORDER — CALCITRIOL 0.5 UG/1
0.5 CAPSULE, LIQUID FILLED ORAL DAILY
COMMUNITY

## 2023-05-24 RX ORDER — GALANTAMINE HYDROBROMIDE 16 MG/1
16 CAPSULE, EXTENDED RELEASE ORAL
COMMUNITY

## 2023-05-24 RX ORDER — DIMETHYL FUMARATE 240 MG/1
1 CAPSULE ORAL 2 TIMES DAILY
COMMUNITY

## 2023-05-24 RX ORDER — LOSARTAN POTASSIUM AND HYDROCHLOROTHIAZIDE 25; 100 MG/1; MG/1
1 TABLET ORAL DAILY
COMMUNITY

## 2023-05-24 RX ORDER — MIRTAZAPINE 30 MG/1
30 TABLET, FILM COATED ORAL NIGHTLY
COMMUNITY

## 2023-05-24 RX ORDER — POTASSIUM CHLORIDE 750 MG/1
20 TABLET, FILM COATED, EXTENDED RELEASE ORAL
COMMUNITY

## 2023-05-24 RX ORDER — ATORVASTATIN CALCIUM 40 MG/1
40 TABLET, FILM COATED ORAL DAILY
COMMUNITY

## 2023-05-24 RX ORDER — AMLODIPINE BESYLATE 5 MG/1
5 TABLET ORAL DAILY
COMMUNITY

## 2023-05-24 RX ORDER — IBUPROFEN 200 MG
200 CAPSULE ORAL DAILY
COMMUNITY

## 2023-05-24 RX ORDER — METOPROLOL SUCCINATE 100 MG/1
100 TABLET, EXTENDED RELEASE ORAL DAILY
COMMUNITY

## 2023-05-24 RX ORDER — PRIMIDONE 50 MG/1
50 TABLET ORAL NIGHTLY
COMMUNITY